# Patient Record
Sex: FEMALE | URBAN - METROPOLITAN AREA
[De-identification: names, ages, dates, MRNs, and addresses within clinical notes are randomized per-mention and may not be internally consistent; named-entity substitution may affect disease eponyms.]

---

## 2021-03-20 ENCOUNTER — EMERGENCY (EMERGENCY)
Facility: HOSPITAL | Age: 61
LOS: 1 days | Discharge: HOME | End: 2021-03-20
Admitting: EMERGENCY MEDICINE
Payer: COMMERCIAL

## 2021-03-20 VITALS
DIASTOLIC BLOOD PRESSURE: 88 MMHG | OXYGEN SATURATION: 97 % | SYSTOLIC BLOOD PRESSURE: 149 MMHG | TEMPERATURE: 98 F | HEART RATE: 84 BPM | RESPIRATION RATE: 18 BRPM

## 2021-03-20 LAB — SARS-COV-2 RNA SPEC QL NAA+PROBE: SIGNIFICANT CHANGE UP

## 2021-03-20 PROCEDURE — U0003: CPT

## 2021-03-20 PROCEDURE — U0005: CPT

## 2021-03-20 PROCEDURE — 99282 EMERGENCY DEPT VISIT SF MDM: CPT

## 2021-03-20 PROCEDURE — 99283 EMERGENCY DEPT VISIT LOW MDM: CPT

## 2021-03-20 NOTE — ED PROVIDER NOTE - NS ED ATTENDING NAME FT
This condition is managed by Specialist.   Key CKD Meds             furosemide (LASIX) 40 mg tablet (Taking) Take 40 mg by mouth daily. lisinopril (PRINIVIL, ZESTRIL) 5 mg tablet (Taking) Take 5 mg by mouth daily. Lab Results   Component Value Date/Time    GFR est non-AA 47 03/07/2019 05:41 AM    GFRNA, POC 31 02/04/2019 12:23 PM    GFR est AA 57 03/07/2019 05:41 AM    GFRAA, POC 38 02/04/2019 12:23 PM    Creatinine 1.48 03/07/2019 05:41 AM    Creatinine (POC) 2.1 02/04/2019 12:23 PM    BUN 40 03/07/2019 05:41 AM    BUN (POC) 40 02/04/2019 12:23 PM    Sodium 136 03/07/2019 05:41 AM    Sodium (POC) 141 02/04/2019 12:23 PM    Potassium 3.4 03/07/2019 05:41 AM    Potassium (POC) 3.1 02/04/2019 12:23 PM    Chloride 95 03/07/2019 05:41 AM    Chloride (POC) 101 02/04/2019 12:23 PM    CO2 33 03/07/2019 05:41 AM    Calcium 8.9 03/07/2019 05:41 AM    Magnesium 1.6 02/28/2019 05:20 AM    Phosphorus 3.8 02/18/2019 04:36 AM    Vitamin D 25-Hydroxy 22.9 02/23/2019 04:00 AM     CrCl cannot be calculated (Patient's most recent lab result is older than the maximum 180 days allowed. ).
This condition is managed by Specialist.  Key Antihyperglycemic Medications             insulin glargine (LANTUS,BASAGLAR) 100 unit/mL (3 mL) inpn (Taking) 5 Units by SubCUTAneous route nightly. Other Key Diabetic Medications             lisinopril (PRINIVIL, ZESTRIL) 5 mg tablet (Taking) Take 5 mg by mouth daily. pravastatin (PRAVACHOL) 20 mg tablet (Taking) Take 1 Tab by mouth nightly.         Lab Results   Component Value Date/Time    Hemoglobin A1c 6.4 02/05/2019 12:02 AM    Glucose 145 03/07/2019 05:41 AM    Creatinine 1.48 03/07/2019 05:41 AM    Creatinine (POC) 2.1 02/04/2019 12:23 PM     Diabetic Foot and Eye Exam HM Status   Topic Date Due    Diabetic Foot Care  05/25/2018    Eye Exam  10/17/2019
This condition is managed by Specialist.  Key Antihyperglycemic Medications             insulin glargine (LANTUS,BASAGLAR) 100 unit/mL (3 mL) inpn (Taking) 5 Units by SubCUTAneous route nightly. Other Key Diabetic Medications             lisinopril (PRINIVIL, ZESTRIL) 5 mg tablet (Taking) Take 5 mg by mouth daily. pravastatin (PRAVACHOL) 20 mg tablet (Taking) Take 1 Tab by mouth nightly.         Lab Results   Component Value Date/Time    Hemoglobin A1c 6.4 02/05/2019 12:02 AM    Glucose 145 03/07/2019 05:41 AM    Creatinine 1.48 03/07/2019 05:41 AM    Creatinine (POC) 2.1 02/04/2019 12:23 PM     Diabetic Foot and Eye Exam HM Status   Topic Date Due    Diabetic Foot Care  05/25/2018    Eye Exam  10/17/2019
This condition is managed by Specialist.  Key CAD CHF Meds             furosemide (LASIX) 40 mg tablet (Taking) Take 40 mg by mouth daily. lisinopril (PRINIVIL, ZESTRIL) 5 mg tablet (Taking) Take 5 mg by mouth daily. carvedilol (COREG) 6.25 mg tablet (Taking) Take 6.25 mg by mouth two (2) times daily (with meals). ELIQUIS 5 mg tablet (Taking) Take 5 mg by mouth two (2) times a day. pravastatin (PRAVACHOL) 20 mg tablet (Taking) Take 1 Tab by mouth nightly.         Lab Results   Component Value Date/Time    Sodium 136 03/07/2019 05:41 AM    Sodium (POC) 141 02/04/2019 12:23 PM    Potassium 3.4 03/07/2019 05:41 AM    Potassium (POC) 3.1 02/04/2019 12:23 PM    INR 1.5 02/22/2019 03:18 AM    Prothrombin time 14.5 02/22/2019 03:18 AM    Digoxin level 1.4 08/04/2018 03:03 PM
yemi

## 2021-03-20 NOTE — ED PROVIDER NOTE - PATIENT PORTAL LINK FT
You can access the FollowMyHealth Patient Portal offered by [facility] by registering at the following website: [email or web address]. By joining [employer]’s FollowMyHealth portal, you will also be able to view your health information using other applications (apps) compatible with our system.

## 2021-03-20 NOTE — ED ADULT TRIAGE NOTE - CHIEF COMPLAINT QUOTE
Pt's daughter called office and was informed that high dose vaccine in not available right now due to  backorder. Will try calling again next week to see if they have come in. No other questions or concerns.    covid testing

## 2021-03-22 DIAGNOSIS — Z20.822 CONTACT WITH AND (SUSPECTED) EXPOSURE TO COVID-19: ICD-10-CM
